# Patient Record
Sex: FEMALE | Race: WHITE | NOT HISPANIC OR LATINO | Employment: UNEMPLOYED | URBAN - METROPOLITAN AREA
[De-identification: names, ages, dates, MRNs, and addresses within clinical notes are randomized per-mention and may not be internally consistent; named-entity substitution may affect disease eponyms.]

---

## 2017-09-25 ENCOUNTER — APPOINTMENT (OUTPATIENT)
Dept: WOMENS IMAGING | Facility: HOSPITAL | Age: 47
End: 2017-09-25

## 2017-09-25 PROCEDURE — 77063 BREAST TOMOSYNTHESIS BI: CPT | Performed by: RADIOLOGY

## 2017-09-25 PROCEDURE — 77067 SCR MAMMO BI INCL CAD: CPT | Performed by: RADIOLOGY

## 2018-11-08 ENCOUNTER — APPOINTMENT (OUTPATIENT)
Dept: WOMENS IMAGING | Facility: HOSPITAL | Age: 48
End: 2018-11-08

## 2018-11-08 PROCEDURE — 77063 BREAST TOMOSYNTHESIS BI: CPT | Performed by: RADIOLOGY

## 2018-11-08 PROCEDURE — 77067 SCR MAMMO BI INCL CAD: CPT | Performed by: RADIOLOGY

## 2019-11-25 ENCOUNTER — APPOINTMENT (OUTPATIENT)
Dept: WOMENS IMAGING | Facility: HOSPITAL | Age: 49
End: 2019-11-25

## 2019-11-25 PROCEDURE — 77067 SCR MAMMO BI INCL CAD: CPT | Performed by: RADIOLOGY

## 2019-11-25 PROCEDURE — 77063 BREAST TOMOSYNTHESIS BI: CPT | Performed by: RADIOLOGY

## 2020-02-17 ENCOUNTER — OUTSIDE FACILITY SERVICE (OUTPATIENT)
Dept: SURGERY | Facility: CLINIC | Age: 50
End: 2020-02-17

## 2020-02-17 PROCEDURE — 45378 DIAGNOSTIC COLONOSCOPY: CPT | Performed by: SURGERY

## 2021-02-05 ENCOUNTER — APPOINTMENT (OUTPATIENT)
Dept: WOMENS IMAGING | Facility: HOSPITAL | Age: 51
End: 2021-02-05

## 2021-02-05 PROCEDURE — 77067 SCR MAMMO BI INCL CAD: CPT | Performed by: RADIOLOGY

## 2021-02-05 PROCEDURE — 77063 BREAST TOMOSYNTHESIS BI: CPT | Performed by: RADIOLOGY

## 2021-03-03 ENCOUNTER — HOSPITAL ENCOUNTER (OUTPATIENT)
Dept: CARDIOLOGY | Facility: HOSPITAL | Age: 51
Discharge: HOME | End: 2021-03-03
Admitting: ORTHOPAEDIC SURGERY

## 2021-03-03 ENCOUNTER — OFFICE VISIT (OUTPATIENT)
Dept: ORTHOPEDIC SURGERY | Facility: CLINIC | Age: 51
End: 2021-03-03

## 2021-03-03 VITALS — BODY MASS INDEX: 19.12 KG/M2 | WEIGHT: 112 LBS | HEIGHT: 64 IN | TEMPERATURE: 98 F

## 2021-03-03 DIAGNOSIS — M76.61 TENDONITIS, ACHILLES, RIGHT: ICD-10-CM

## 2021-03-03 DIAGNOSIS — M79.661 RIGHT CALF PAIN: ICD-10-CM

## 2021-03-03 DIAGNOSIS — Q68.8 OS TRIGONUM: Primary | ICD-10-CM

## 2021-03-03 LAB
BH CV LOWER VASCULAR LEFT COMMON FEMORAL AUGMENT: NORMAL
BH CV LOWER VASCULAR LEFT COMMON FEMORAL COMPETENT: NORMAL
BH CV LOWER VASCULAR LEFT COMMON FEMORAL COMPRESS: NORMAL
BH CV LOWER VASCULAR LEFT COMMON FEMORAL PHASIC: NORMAL
BH CV LOWER VASCULAR LEFT COMMON FEMORAL SPONT: NORMAL
BH CV LOWER VASCULAR RIGHT COMMON FEMORAL AUGMENT: NORMAL
BH CV LOWER VASCULAR RIGHT COMMON FEMORAL COMPETENT: NORMAL
BH CV LOWER VASCULAR RIGHT COMMON FEMORAL COMPRESS: NORMAL
BH CV LOWER VASCULAR RIGHT COMMON FEMORAL PHASIC: NORMAL
BH CV LOWER VASCULAR RIGHT COMMON FEMORAL SPONT: NORMAL
BH CV LOWER VASCULAR RIGHT DISTAL FEMORAL COMPRESS: NORMAL
BH CV LOWER VASCULAR RIGHT GASTRONEMIUS COMPRESS: NORMAL
BH CV LOWER VASCULAR RIGHT GREATER SAPH AK COMPRESS: NORMAL
BH CV LOWER VASCULAR RIGHT GREATER SAPH BK COMPRESS: NORMAL
BH CV LOWER VASCULAR RIGHT LESSER SAPH COMPRESS: NORMAL
BH CV LOWER VASCULAR RIGHT MID FEMORAL AUGMENT: NORMAL
BH CV LOWER VASCULAR RIGHT MID FEMORAL COMPETENT: NORMAL
BH CV LOWER VASCULAR RIGHT MID FEMORAL COMPRESS: NORMAL
BH CV LOWER VASCULAR RIGHT MID FEMORAL PHASIC: NORMAL
BH CV LOWER VASCULAR RIGHT MID FEMORAL SPONT: NORMAL
BH CV LOWER VASCULAR RIGHT PERONEAL COMPRESS: NORMAL
BH CV LOWER VASCULAR RIGHT POPLITEAL AUGMENT: NORMAL
BH CV LOWER VASCULAR RIGHT POPLITEAL COMPETENT: NORMAL
BH CV LOWER VASCULAR RIGHT POPLITEAL COMPRESS: NORMAL
BH CV LOWER VASCULAR RIGHT POPLITEAL PHASIC: NORMAL
BH CV LOWER VASCULAR RIGHT POPLITEAL SPONT: NORMAL
BH CV LOWER VASCULAR RIGHT POSTERIOR TIBIAL COMPRESS: NORMAL
BH CV LOWER VASCULAR RIGHT PROFUNDA FEMORAL COMPRESS: NORMAL
BH CV LOWER VASCULAR RIGHT PROXIMAL FEMORAL COMPRESS: NORMAL
BH CV LOWER VASCULAR RIGHT SAPHENOFEMORAL JUNCTION COMPRESS: NORMAL
BH CV LOWER VASCULAR RIGHT SOLEAL COMPRESS: NORMAL

## 2021-03-03 PROCEDURE — 99244 OFF/OP CNSLTJ NEW/EST MOD 40: CPT | Performed by: ORTHOPAEDIC SURGERY

## 2021-03-03 PROCEDURE — 93971 EXTREMITY STUDY: CPT

## 2021-03-03 RX ORDER — ERGOCALCIFEROL 1.25 MG/1
50000 CAPSULE ORAL WEEKLY
COMMUNITY

## 2021-03-03 RX ORDER — DICLOFENAC SODIUM 20 MG/G
1 SOLUTION TOPICAL 2 TIMES DAILY
Qty: 112 G | Refills: 12 | Status: SHIPPED | OUTPATIENT
Start: 2021-03-03

## 2021-03-03 RX ORDER — MULTIVIT WITH MINERALS/LUTEIN
250 TABLET ORAL DAILY
COMMUNITY

## 2021-03-03 NOTE — PROGRESS NOTES
New Patient Complaint      Patient: Mellissa Pereira  YOB: 1970 50 y.o. female  Medical Record Number: [id number]    Chief Complaints: My ankle hurts    History of Present Illness: Patient reports that she received her second Covid vaccine in December and subsequent to that had multiple joint aches and pains which seemed to improve however she then subsequently had onset of pain in the right ankle and leg that was initially globally about the ankle anteriorly and posteriorly and now seems to localize more posteriorly along the ankle and posterior calf and along the Achilles.    She now has some painless clicking and popping to the ankle that she did not have previously.  She tried to quit running for a little bit but did not improve and resumed running activities again.  She tried some ibuprofen without relief but stopped as it bothers her stomach and bladder.    She denies any numbness or tingling.      She is seen today at the request of Dr. Mile Chamorro who has requested my opinion regarding etiology and treatment of this condition        HPI    Allergies: No Known Allergies    Medications:   Current Outpatient Medications on File Prior to Visit   Medication Sig   • vitamin C (ASCORBIC ACID) 250 MG tablet Take 250 mg by mouth Daily.   • vitamin D (ERGOCALCIFEROL) 1.25 MG (19996 UT) capsule capsule Take 50,000 Units by mouth 1 (One) Time Per Week.     No current facility-administered medications on file prior to visit.        No past medical history on file.  No past surgical history on file.  Social History     Occupational History   • Not on file   Tobacco Use   • Smoking status: Never Smoker   • Smokeless tobacco: Never Used   Substance and Sexual Activity   • Alcohol use: Yes   • Drug use: Never   • Sexual activity: Defer      Social History     Social History Narrative   • Not on file     Family History   Problem Relation Age of Onset   • Hypertension Mother    • Hypertension Father    •  "Cancer Maternal Grandmother        Review of Systems: 14 point review of systems performed, positive pertinent findings identified in HPI. All remaining systems negative     Review of Systems      Physical Exam:   Vitals:    03/03/21 0810   Temp: 98 °F (36.7 °C)   Weight: 50.8 kg (112 lb)   Height: 162.6 cm (64\")   PainSc:   5     Physical Exam   Constitutional: pleasant, well developed   Eyes: sclera non icteric  Hearing : adequate for exam  Cardiovascular: palpable pulses in right foot, right calf mildly tender  but no focal venous cords or warmth.  Respiratoy: breathing unlabored   Neurological: grossly sensate to LT throughout right LE  Psychiatric: oriented with normal mood and affect.   Lymphatic: No palpable popliteal lymphadenopathy right LE  Skin: intact throughout right leg/foot  Musculoskeletal: She has a nonantalgic gait.  She is fairly tight in her right calf with discomfort along the mid substance of the Achilles but no focal deficits and good plantarflexion strength.  There was minimal if any swelling to the ankle but some painless popping and cracking with circumduction exercises and discomfort in the posterior aspect of the ankle along the deep to the Achilles in the area of the os trigonum.  Physical Exam  Ortho Exam    Radiology: 3 views of the right ankle ordered evaluate pain reviewed and no prior x-rays available for comparison.  To be a small os trigonum talus appears well-seated within the mortise.  Some questionable irregularity along the far posterior aspect of the talar dome in the area of the os trigonum as well.    Assessment/Plan: 1.  Right ankle pain with possible stress fracture and/or symptomatic os trigonum  2.  Right calf pain  3.  Right Achilles tightness with probable tendinitis.    We discussed treatment options and first concern is for blood clot especially with having onset of pain after receiving the Covid vaccine.  Her first and oriented to get an ultrasound to make sure is " no blood clot.    Also demonstrated heel cord stretching exercises to do least 4 times a day.  Instruction she was provided and demonstrated for her and she will do these as she can without exacerbating pain to the anterior ankle.    Also recommend that she stop any running activities.    Also have her apply Pennsaid to the area along the posterior Achilles and musculotendinous junction in the area of her pain as she has been unable to tolerate oral anti-inflammatories.    Given her lack of improvement with rest anti-inflammatories etc. I think we need to go ahead and get an MRI to make sure is not any stress fracture or chondral lesion to the ankle and evaluate the extent of involvement of her Achilles to help tailor treatment protocol.    We will see her back in about 3 weeks to evaluate progress and review MRI and determine treatment course going forward but if anything worsens in the interim she will let me know.

## 2021-03-24 ENCOUNTER — BULK ORDERING (OUTPATIENT)
Dept: CASE MANAGEMENT | Facility: OTHER | Age: 51
End: 2021-03-24

## 2021-03-24 DIAGNOSIS — Z23 IMMUNIZATION DUE: ICD-10-CM

## 2021-04-01 ENCOUNTER — HOSPITAL ENCOUNTER (OUTPATIENT)
Dept: MRI IMAGING | Facility: HOSPITAL | Age: 51
Discharge: HOME | End: 2021-04-01
Admitting: ORTHOPAEDIC SURGERY

## 2021-04-01 ENCOUNTER — APPOINTMENT (OUTPATIENT)
Dept: MRI IMAGING | Facility: HOSPITAL | Age: 51
End: 2021-04-01

## 2021-04-01 DIAGNOSIS — M76.61 TENDONITIS, ACHILLES, RIGHT: ICD-10-CM

## 2021-04-01 DIAGNOSIS — Q68.8 OS TRIGONUM: ICD-10-CM

## 2021-04-01 PROCEDURE — 73721 MRI JNT OF LWR EXTRE W/O DYE: CPT

## 2021-04-15 ENCOUNTER — OFFICE VISIT (OUTPATIENT)
Dept: ORTHOPEDIC SURGERY | Facility: CLINIC | Age: 51
End: 2021-04-15

## 2021-04-15 VITALS — BODY MASS INDEX: 19.12 KG/M2 | HEIGHT: 64 IN | TEMPERATURE: 96.6 F | WEIGHT: 112 LBS

## 2021-04-15 DIAGNOSIS — Q68.8 OS TRIGONUM: Primary | ICD-10-CM

## 2021-04-15 PROCEDURE — 99213 OFFICE O/P EST LOW 20 MIN: CPT | Performed by: ORTHOPAEDIC SURGERY

## 2021-04-15 RX ORDER — ELETRIPTAN HYDROBROMIDE 40 MG/1
TABLET, FILM COATED ORAL
COMMUNITY
Start: 2021-04-06

## 2021-04-15 NOTE — PROGRESS NOTES
"Ankle Follow Up      Patient: Mellissa Pereira    YOB: 1970 50 y.o. female    Chief Complaints: Ankle feeling better    History of Present Illness: Patient was seen initially on 3/3/2021 and reported that she had received her second Covid vaccine in December and subsequent to that had multiple joint aches.  This pain seemed to improve however that she subsequently had onset of pain in the right ankle and leg but was initially globally about the ankle anteriorly and posteriorly and at our visit that seemed to become to localize more posteriorly in the ankle and posterior calf on the Achilles.  She reported some painless clicking and popping to the ankle that she did not have previously had a try to quit running for a while but did not improve and resumed activities of running again.  She tried ibuprofen without relief but stopped it as it bothered her stomach and bladder.    She was sent for Doppler to evaluate for any blood clot which she received on 3/3/2021 and was negative for DVT.  Also demonstrated heel cord stretching exercises to do and to stop running activities.  She was instructed on use of Pennsaid along the posterior Achilles and is seen back today for further evaluation.      She is seen back today and is stopped running and been riding a bike and doing quite a bit of stretching exercises the pain along her calf has now resolved and still gets a slight ache in the inferolateral aspect of the right hindfoot symptoms with biking activities and has had some relief with Pennsaid when things flareup but overall feels  better.  She has been doing stretching exercises as well.      HPI    ROS: ankle pain  History reviewed. No pertinent past medical history.    Physical Exam:   Vitals:    04/15/21 0831   Temp: 96.6 °F (35.9 °C)   Weight: 50.8 kg (112 lb)   Height: 162.6 cm (64\")   PainSc:   2     Well developed with normal mood.  Exam she has a nonantalgic gait.  She was nontender in the calf " nor along the Achilles there was some mild discomfort in the inferolateral aspect of the hindfoot but no exacerbation with maximum plantarflexion there was some discomfort in the posterior ankle with flexion of the great toe but no weakness and no pain medially at the ankle.      Radiology: MRI films and report of the right ankle dated 4/1/2021 reviewed which shows small tibiotalar and posterior subtalar joint effusion and an os trigonum measuring 14 mm x 9 mm x 8 mm with mild edema and marrow around the synchondrosis.  Marrow signal is otherwise unremarkable there is no evidence of stress fracture and no osteochondral lesion of the tibiotalar or subtalar joints.    Medial lateral ankle ligaments as well as flexor and extensor tendons appeared normal and specifically the Achilles tendon appears normal.      Assessment/Plan: 1.  Symptomatic right os trigonum    Overall she does feel much better the pain along her calf and Achilles has improved and the os trigonum is not bothering her enough for her surgical treatment.    Although she does have some discomfort with flexion of the great toe there was no obvious abnormality of the FHL on MRI.    We discussed continued nonoperative versus operative treatment options and nothing is bothering her enough for surgical treatment at this time.    She will continue with stretching exercises and has been doing more walking activities yoga and stretching which she will stick with rather than higher impact exercise and adjust activities as pain allows.    She will continue with stretching and use of Pennsaid as needed and if anything worsens let me know otherwise I will see her back as needed

## 2021-05-29 ENCOUNTER — TELEPHONE (OUTPATIENT)
Dept: URGENT CARE | Facility: CLINIC | Age: 51
End: 2021-05-29

## 2022-10-05 ENCOUNTER — APPOINTMENT (OUTPATIENT)
Dept: WOMENS IMAGING | Facility: HOSPITAL | Age: 52
End: 2022-10-05

## 2022-10-05 PROCEDURE — 77063 BREAST TOMOSYNTHESIS BI: CPT | Performed by: RADIOLOGY

## 2022-10-05 PROCEDURE — 77067 SCR MAMMO BI INCL CAD: CPT | Performed by: RADIOLOGY

## 2024-11-07 ENCOUNTER — TRANSCRIBE ORDERS (OUTPATIENT)
Dept: ADMINISTRATIVE | Facility: HOSPITAL | Age: 54
End: 2024-11-07
Payer: COMMERCIAL

## 2024-11-07 DIAGNOSIS — M81.0 OSTEOPOROSIS, SENILE: Primary | ICD-10-CM

## 2024-11-15 PROBLEM — M81.0 SENILE OSTEOPOROSIS: Status: ACTIVE | Noted: 2024-11-15

## 2024-11-15 RX ORDER — SODIUM CHLORIDE 9 MG/ML
20 INJECTION, SOLUTION INTRAVENOUS ONCE
OUTPATIENT
Start: 2024-11-20

## 2024-11-15 RX ORDER — ZOLEDRONIC ACID 0.05 MG/ML
5 INJECTION, SOLUTION INTRAVENOUS ONCE
OUTPATIENT
Start: 2024-11-20

## 2024-11-20 ENCOUNTER — HOSPITAL ENCOUNTER (OUTPATIENT)
Dept: INFUSION THERAPY | Facility: HOSPITAL | Age: 54
Discharge: HOME | End: 2024-11-20
Admitting: OBSTETRICS & GYNECOLOGY
Payer: COMMERCIAL

## 2024-11-20 VITALS
BODY MASS INDEX: 19.46 KG/M2 | HEIGHT: 64 IN | HEART RATE: 71 BPM | DIASTOLIC BLOOD PRESSURE: 60 MMHG | SYSTOLIC BLOOD PRESSURE: 96 MMHG | TEMPERATURE: 97.9 F | RESPIRATION RATE: 16 BRPM | WEIGHT: 114 LBS | OXYGEN SATURATION: 99 %

## 2024-11-20 DIAGNOSIS — M81.0 SENILE OSTEOPOROSIS: Primary | ICD-10-CM

## 2024-11-20 LAB
ANION GAP SERPL CALCULATED.3IONS-SCNC: 10 MMOL/L (ref 5–15)
BUN SERPL-MCNC: 27 MG/DL (ref 6–20)
BUN/CREAT SERPL: 28.4 (ref 7–25)
CALCIUM SPEC-SCNC: 9.3 MG/DL (ref 8.6–10.5)
CHLORIDE SERPL-SCNC: 102 MMOL/L (ref 98–107)
CO2 SERPL-SCNC: 24 MMOL/L (ref 22–29)
CREAT SERPL-MCNC: 0.95 MG/DL (ref 0.57–1)
EGFRCR SERPLBLD CKD-EPI 2021: 70.9 ML/MIN/1.73
GLUCOSE SERPL-MCNC: 89 MG/DL (ref 65–99)
MAGNESIUM SERPL-MCNC: 2.4 MG/DL (ref 1.6–2.6)
PHOSPHATE SERPL-MCNC: 3.4 MG/DL (ref 2.5–4.5)
POTASSIUM SERPL-SCNC: 4.5 MMOL/L (ref 3.5–5.2)
SODIUM SERPL-SCNC: 136 MMOL/L (ref 136–145)

## 2024-11-20 PROCEDURE — 36415 COLL VENOUS BLD VENIPUNCTURE: CPT

## 2024-11-20 PROCEDURE — 25010000002 ZOLEDRONIC ACID 5 MG/100ML SOLUTION: Performed by: OBSTETRICS & GYNECOLOGY

## 2024-11-20 PROCEDURE — 84100 ASSAY OF PHOSPHORUS: CPT | Performed by: OBSTETRICS & GYNECOLOGY

## 2024-11-20 PROCEDURE — 96374 THER/PROPH/DIAG INJ IV PUSH: CPT

## 2024-11-20 PROCEDURE — 83735 ASSAY OF MAGNESIUM: CPT | Performed by: OBSTETRICS & GYNECOLOGY

## 2024-11-20 PROCEDURE — 80048 BASIC METABOLIC PNL TOTAL CA: CPT | Performed by: OBSTETRICS & GYNECOLOGY

## 2024-11-20 RX ORDER — VITAMIN B COMPLEX
1 CAPSULE ORAL DAILY
COMMUNITY

## 2024-11-20 RX ORDER — SODIUM CHLORIDE 9 MG/ML
20 INJECTION, SOLUTION INTRAVENOUS ONCE
Status: CANCELLED | OUTPATIENT
Start: 2024-11-20

## 2024-11-20 RX ORDER — HYDROXYZINE HYDROCHLORIDE 25 MG/1
25 TABLET, FILM COATED ORAL
COMMUNITY
Start: 2024-10-04

## 2024-11-20 RX ORDER — CALCIUM CARBONATE/VITAMIN D3 600 MG-10
1200 TABLET ORAL DAILY
COMMUNITY

## 2024-11-20 RX ORDER — ZOLEDRONIC ACID 0.05 MG/ML
5 INJECTION, SOLUTION INTRAVENOUS ONCE
Status: CANCELLED | OUTPATIENT
Start: 2024-11-20

## 2024-11-20 RX ORDER — PROGESTERONE 200 MG/1
200 CAPSULE ORAL DAILY
COMMUNITY

## 2024-11-20 RX ORDER — ZOLEDRONIC ACID 0.05 MG/ML
5 INJECTION, SOLUTION INTRAVENOUS ONCE
COMMUNITY

## 2024-11-20 RX ORDER — ESTRADIOL 0.03 MG/D
1 PATCH TRANSDERMAL WEEKLY
COMMUNITY

## 2024-11-20 RX ORDER — ZOLEDRONIC ACID 0.05 MG/ML
5 INJECTION, SOLUTION INTRAVENOUS ONCE
Status: COMPLETED | OUTPATIENT
Start: 2024-11-20 | End: 2024-11-20

## 2024-11-20 RX ORDER — CALCIUM CARBONATE/VITAMIN D3 500-10/5ML
30 LIQUID (ML) ORAL DAILY
COMMUNITY

## 2024-11-20 RX ADMIN — ZOLEDRONIC ACID 5 MG: 5 INJECTION INTRAVENOUS at 13:33
